# Patient Record
Sex: FEMALE | Race: OTHER | NOT HISPANIC OR LATINO | URBAN - METROPOLITAN AREA
[De-identification: names, ages, dates, MRNs, and addresses within clinical notes are randomized per-mention and may not be internally consistent; named-entity substitution may affect disease eponyms.]

---

## 2017-06-20 ENCOUNTER — EMERGENCY (EMERGENCY)
Facility: HOSPITAL | Age: 22
LOS: 1 days | Discharge: HOME | End: 2017-06-20
Attending: EMERGENCY MEDICINE | Admitting: EMERGENCY MEDICINE
Payer: COMMERCIAL

## 2017-06-20 VITALS
RESPIRATION RATE: 18 BRPM | TEMPERATURE: 98 F | SYSTOLIC BLOOD PRESSURE: 107 MMHG | HEART RATE: 82 BPM | DIASTOLIC BLOOD PRESSURE: 73 MMHG | OXYGEN SATURATION: 100 %

## 2017-06-20 VITALS
OXYGEN SATURATION: 100 % | HEART RATE: 74 BPM | DIASTOLIC BLOOD PRESSURE: 72 MMHG | RESPIRATION RATE: 16 BRPM | SYSTOLIC BLOOD PRESSURE: 103 MMHG

## 2017-06-20 DIAGNOSIS — N75.0 CYST OF BARTHOLIN'S GLAND: ICD-10-CM

## 2017-06-20 PROCEDURE — 99284 EMERGENCY DEPT VISIT MOD MDM: CPT

## 2017-06-20 RX ORDER — ACETAMINOPHEN 500 MG
975 TABLET ORAL ONCE
Qty: 0 | Refills: 0 | Status: COMPLETED | OUTPATIENT
Start: 2017-06-20 | End: 2017-06-20

## 2017-06-20 RX ADMIN — Medication 975 MILLIGRAM(S): at 12:42

## 2017-06-20 RX ADMIN — Medication 975 MILLIGRAM(S): at 11:19

## 2017-06-20 NOTE — CONSULT NOTE ADULT - PROBLEM SELECTOR RECOMMENDATION 9
-Tylenol and Motrin for analgesia  -warm compresses  -sitz baths  -Gc/Ct cx  -outpatient follow-up within 1 week, info given for [facility] practice    d/w Dr Jasso

## 2017-06-20 NOTE — ED PROVIDER NOTE - OBJECTIVE STATEMENT
BRITTNI Lucero  22F hx bartholin gland abscess s/p marsupialization 2 years ago on left presents to the ED with barothloin gland swelling and pain. Pain started 2 days ago now with swelling. Feels like previous bartholin abscess. No fevers nausea vomiting vaginal discharge dysuria abdominal pain. Pt was recently tested for std which was negative so doesn't want to be tested at this time.

## 2017-06-20 NOTE — ED PROVIDER NOTE - NS ED ROS FT
Constitutional: No fever or chills  Eyes: No visual changes  HEENT: No throat pain  CV: No chest pain  Resp: No SOB no cough  GI: No abd pain, nausea or vomiting  : No dysuria no discharge. + swelling to vaginal region  MSK: No musculoskeletal pain  Skin: No rash  Neuro: No headache

## 2017-06-20 NOTE — ED PROVIDER NOTE - PHYSICAL EXAMINATION
Constitutional: mild distress  Eyes: PERRLA EOMI  Head: Normocephalic atraumatic  Cardiac: regular rate   Resp: Lungs CTAB  GI: Abd s/nt/nd  : Chaperone - Cat - pt with swelling and flutuance at 7 o'clock position. no signs of infection.  no rashes.   Neuro: CN2-12 intact  Skin: No rashes

## 2017-06-20 NOTE — CONSULT NOTE ADULT - SUBJECTIVE AND OBJECTIVE BOX
Chief Complaint: R sided labial pain    HPI: 21 y/o  presents complaining of R sided labial pain since last night.  Pain is 8/10 when sitting, improved with standing, not relieved by Motrin or compresses.  Patient has history of L-sided Bartholin Cyst, s/p Marsupialization in 10/2015 and has been asymptomatic since that time.  Patient denies vaginal discharge and vaginal bleeding.  Patient is sexually active with one male partner, uses condoms as contraception.    OB Hx: etop with D+C  Gyn Hx: as above    PAST MEDICAL & SURGICAL HISTORY:  No pertinent past medical history  No significant past surgical history      Allergies    No Known Allergies    Intolerances        MEDICATIONS  (STANDING):none    MEDICATIONS  (PRN): motrin      ROS:  CONSTITUTIONAL: No fever, weight loss, or fatigue  GENITOURINARY: Labial pain, no discharge or vaginal bleeding.  No dysuria, frequency, hematuria, or incontinence    PHYSICAL EXAM-      Vital Signs Last 24 Hrs  T(C): 36.9, Max: 36.9 (06-20 @ 09:41)  T(F): 98.4, Max: 98.4 (06-20 @ 09:41)  HR: 74 (74 - 82)  BP: 103/72 (103/72 - 107/73)  BP(mean): --  RR: 16 (16 - 18)  SpO2: 100% (100% - 100%)    Constitutional: well developed, well nourished, no apparent distress, alert, oriented x 3.    Abdomen: soft, non-tender, +BS, no guarding/rebound/rigidity/organomegaly.    Pelvic exam- External genitalia- R sided 2-3 cm non-fluctuant cyst, no overlying erythema or discharge

## 2017-06-20 NOTE — ED PROVIDER NOTE - PLAN OF CARE
1. return for worsening symptoms or anything concerning to you  2. take all home meds as prescribed  3. follow up with your pmd call to make an appointment  4. use sitz baths and warm compresses as directed  5. follow up with obgyn call [phone]

## 2017-06-20 NOTE — ED PROVIDER NOTE - PROGRESS NOTE DETAILS
pt seen by obgyn service - not large enough to I and d now - want patient to have sitz baths, warm compresses and follow up in office. pt ok with plan. Quan Lainez M.D. Dr. Henderson Note: agree with Gyn recommendation and will have pt f/u outp s/p period of Sitz baths.

## 2017-06-20 NOTE — ED PROVIDER NOTE - MEDICAL DECISION MAKING DETAILS
22F hx of bartholin gland abscess presents with swelling and pain at the 7 o'clock position that feels like previous abscess. Will have obgyn see patient pain control and reassess

## 2017-06-20 NOTE — ED PROVIDER NOTE - ATTENDING CONTRIBUTION TO CARE
Pt with right side Bartholin cyst, small on exam, about 2cm max diameter, tender, non-draining.  Will consult Gyn given Dr. Lucero has performed surgery for this problem in past.

## 2017-06-20 NOTE — ED ADULT NURSE NOTE - OBJECTIVE STATEMENT
c/o bartholin cyst. Painful marble sized swelling at right side of vaginal opening x 1 day. Reports hx of bartholin cyst 2 years ago which required admission to hospital for I&D in OR. Denies fever, chills or any urinary complaints.

## 2017-06-21 LAB
C TRACH RRNA SPEC QL NAA+PROBE: SIGNIFICANT CHANGE UP
N GONORRHOEA RRNA SPEC QL NAA+PROBE: SIGNIFICANT CHANGE UP
SPECIMEN SOURCE: SIGNIFICANT CHANGE UP

## 2017-06-24 ENCOUNTER — EMERGENCY (EMERGENCY)
Facility: HOSPITAL | Age: 22
LOS: 1 days | Discharge: HOME | End: 2017-06-24
Attending: PEDIATRICS | Admitting: PEDIATRICS
Payer: COMMERCIAL

## 2017-06-24 VITALS
OXYGEN SATURATION: 99 % | SYSTOLIC BLOOD PRESSURE: 116 MMHG | RESPIRATION RATE: 18 BRPM | DIASTOLIC BLOOD PRESSURE: 77 MMHG | HEART RATE: 89 BPM

## 2017-06-24 VITALS
TEMPERATURE: 99 F | OXYGEN SATURATION: 99 % | SYSTOLIC BLOOD PRESSURE: 112 MMHG | RESPIRATION RATE: 18 BRPM | DIASTOLIC BLOOD PRESSURE: 75 MMHG | HEART RATE: 96 BPM

## 2017-06-24 DIAGNOSIS — N75.1 ABSCESS OF BARTHOLIN'S GLAND: ICD-10-CM

## 2017-06-24 LAB
ALBUMIN SERPL ELPH-MCNC: 4.2 G/DL — SIGNIFICANT CHANGE UP (ref 3.3–5)
ALP SERPL-CCNC: 62 U/L — SIGNIFICANT CHANGE UP (ref 40–120)
ALT FLD-CCNC: 9 U/L RC — LOW (ref 10–45)
ANION GAP SERPL CALC-SCNC: 16 MMOL/L — SIGNIFICANT CHANGE UP (ref 5–17)
APPEARANCE UR: CLEAR — SIGNIFICANT CHANGE UP
AST SERPL-CCNC: 15 U/L — SIGNIFICANT CHANGE UP (ref 10–40)
BASOPHILS # BLD AUTO: 0.1 K/UL — SIGNIFICANT CHANGE UP (ref 0–0.2)
BILIRUB SERPL-MCNC: 0.3 MG/DL — SIGNIFICANT CHANGE UP (ref 0.2–1.2)
BILIRUB UR-MCNC: NEGATIVE — SIGNIFICANT CHANGE UP
BUN SERPL-MCNC: 10 MG/DL — SIGNIFICANT CHANGE UP (ref 7–23)
CALCIUM SERPL-MCNC: 10.2 MG/DL — SIGNIFICANT CHANGE UP (ref 8.4–10.5)
CHLORIDE SERPL-SCNC: 103 MMOL/L — SIGNIFICANT CHANGE UP (ref 96–108)
CO2 SERPL-SCNC: 22 MMOL/L — SIGNIFICANT CHANGE UP (ref 22–31)
COLOR SPEC: YELLOW — SIGNIFICANT CHANGE UP
CREAT SERPL-MCNC: 0.74 MG/DL — SIGNIFICANT CHANGE UP (ref 0.5–1.3)
DIFF PNL FLD: NEGATIVE — SIGNIFICANT CHANGE UP
EOSINOPHIL # BLD AUTO: 0 K/UL — SIGNIFICANT CHANGE UP (ref 0–0.5)
EPI CELLS # UR: SIGNIFICANT CHANGE UP /HPF
GAS PNL BLDV: SIGNIFICANT CHANGE UP
GLUCOSE SERPL-MCNC: 90 MG/DL — SIGNIFICANT CHANGE UP (ref 70–99)
GLUCOSE UR QL: NEGATIVE — SIGNIFICANT CHANGE UP
HCG SERPL-ACNC: <2 MIU/ML — SIGNIFICANT CHANGE UP
HCT VFR BLD CALC: 40.1 % — SIGNIFICANT CHANGE UP (ref 34.5–45)
HGB BLD-MCNC: 13.8 G/DL — SIGNIFICANT CHANGE UP (ref 11.5–15.5)
KETONES UR-MCNC: ABNORMAL
LEUKOCYTE ESTERASE UR-ACNC: NEGATIVE — SIGNIFICANT CHANGE UP
LYMPHOCYTES # BLD AUTO: 1.6 K/UL — SIGNIFICANT CHANGE UP (ref 1–3.3)
LYMPHOCYTES # BLD AUTO: 7 % — LOW (ref 13–44)
MCHC RBC-ENTMCNC: 32 PG — SIGNIFICANT CHANGE UP (ref 27–34)
MCHC RBC-ENTMCNC: 34.4 GM/DL — SIGNIFICANT CHANGE UP (ref 32–36)
MCV RBC AUTO: 93 FL — SIGNIFICANT CHANGE UP (ref 80–100)
MONOCYTES # BLD AUTO: 1 K/UL — HIGH (ref 0–0.9)
MONOCYTES NFR BLD AUTO: 4 % — SIGNIFICANT CHANGE UP (ref 2–14)
NEUTROPHILS # BLD AUTO: 12.8 K/UL — HIGH (ref 1.8–7.4)
NEUTROPHILS NFR BLD AUTO: 89 % — HIGH (ref 43–77)
NITRITE UR-MCNC: NEGATIVE — SIGNIFICANT CHANGE UP
PH UR: 6 — SIGNIFICANT CHANGE UP (ref 5–8)
PLATELET # BLD AUTO: 371 K/UL — SIGNIFICANT CHANGE UP (ref 150–400)
POTASSIUM SERPL-MCNC: 4.2 MMOL/L — SIGNIFICANT CHANGE UP (ref 3.5–5.3)
POTASSIUM SERPL-SCNC: 4.2 MMOL/L — SIGNIFICANT CHANGE UP (ref 3.5–5.3)
PROT SERPL-MCNC: 8.2 G/DL — SIGNIFICANT CHANGE UP (ref 6–8.3)
PROT UR-MCNC: SIGNIFICANT CHANGE UP
RBC # BLD: 4.31 M/UL — SIGNIFICANT CHANGE UP (ref 3.8–5.2)
RBC # FLD: 12.2 % — SIGNIFICANT CHANGE UP (ref 10.3–14.5)
SODIUM SERPL-SCNC: 141 MMOL/L — SIGNIFICANT CHANGE UP (ref 135–145)
SP GR SPEC: 1.02 — SIGNIFICANT CHANGE UP (ref 1.01–1.02)
UROBILINOGEN FLD QL: NEGATIVE — SIGNIFICANT CHANGE UP
WBC # BLD: 15.5 K/UL — HIGH (ref 3.8–10.5)
WBC # FLD AUTO: 15.5 K/UL — HIGH (ref 3.8–10.5)
WBC UR QL: SIGNIFICANT CHANGE UP /HPF (ref 0–5)

## 2017-06-24 PROCEDURE — 82947 ASSAY GLUCOSE BLOOD QUANT: CPT

## 2017-06-24 PROCEDURE — 84295 ASSAY OF SERUM SODIUM: CPT

## 2017-06-24 PROCEDURE — 85014 HEMATOCRIT: CPT

## 2017-06-24 PROCEDURE — 80053 COMPREHEN METABOLIC PANEL: CPT

## 2017-06-24 PROCEDURE — 87205 SMEAR GRAM STAIN: CPT

## 2017-06-24 PROCEDURE — 87086 URINE CULTURE/COLONY COUNT: CPT

## 2017-06-24 PROCEDURE — 83605 ASSAY OF LACTIC ACID: CPT

## 2017-06-24 PROCEDURE — 82803 BLOOD GASES ANY COMBINATION: CPT

## 2017-06-24 PROCEDURE — 84702 CHORIONIC GONADOTROPIN TEST: CPT

## 2017-06-24 PROCEDURE — 99284 EMERGENCY DEPT VISIT MOD MDM: CPT

## 2017-06-24 PROCEDURE — 56420 I&D BARTHOLINS GLAND ABSCESS: CPT | Mod: RT

## 2017-06-24 PROCEDURE — 96374 THER/PROPH/DIAG INJ IV PUSH: CPT | Mod: XU

## 2017-06-24 PROCEDURE — 81001 URINALYSIS AUTO W/SCOPE: CPT

## 2017-06-24 PROCEDURE — 99284 EMERGENCY DEPT VISIT MOD MDM: CPT | Mod: 25

## 2017-06-24 PROCEDURE — 82435 ASSAY OF BLOOD CHLORIDE: CPT

## 2017-06-24 PROCEDURE — 87070 CULTURE OTHR SPECIMN AEROBIC: CPT

## 2017-06-24 PROCEDURE — 85027 COMPLETE CBC AUTOMATED: CPT

## 2017-06-24 PROCEDURE — 84132 ASSAY OF SERUM POTASSIUM: CPT

## 2017-06-24 PROCEDURE — 56420 I&D BARTHOLINS GLAND ABSCESS: CPT

## 2017-06-24 PROCEDURE — 82330 ASSAY OF CALCIUM: CPT

## 2017-06-24 RX ORDER — MORPHINE SULFATE 50 MG/1
4 CAPSULE, EXTENDED RELEASE ORAL ONCE
Qty: 0 | Refills: 0 | Status: DISCONTINUED | OUTPATIENT
Start: 2017-06-24 | End: 2017-06-24

## 2017-06-24 RX ORDER — LIDOCAINE 4 G/100G
1 CREAM TOPICAL ONCE
Qty: 0 | Refills: 0 | Status: DISCONTINUED | OUTPATIENT
Start: 2017-06-24 | End: 2017-06-28

## 2017-06-24 RX ORDER — CEPHALEXIN 500 MG
1 CAPSULE ORAL
Qty: 0 | Refills: 0 | COMMUNITY

## 2017-06-24 RX ORDER — METRONIDAZOLE 7.5 MG/G
0 GEL VAGINAL
Qty: 0 | Refills: 0 | COMMUNITY

## 2017-06-24 RX ORDER — MORPHINE SULFATE 50 MG/1
4 CAPSULE, EXTENDED RELEASE ORAL ONCE
Qty: 0 | Refills: 0 | Status: COMPLETED | OUTPATIENT
Start: 2017-06-24 | End: 2017-06-24

## 2017-06-24 RX ADMIN — MORPHINE SULFATE 4 MILLIGRAM(S): 50 CAPSULE, EXTENDED RELEASE ORAL at 15:50

## 2017-06-24 RX ADMIN — MORPHINE SULFATE 4 MILLIGRAM(S): 50 CAPSULE, EXTENDED RELEASE ORAL at 15:43

## 2017-06-24 NOTE — CONSULT NOTE ADULT - PROBLEM SELECTOR RECOMMENDATION 9
-marsupialization performed in the ED  -f/u wound cultures   -continue keflex   -follow up with GYN on Tuesday, per patient she has an appointment with private GYN in [place]  Dr. Souza at bedside for procedure  Katherine Jean Baptiste [id number]

## 2017-06-24 NOTE — ED ADULT NURSE NOTE - OBJECTIVE STATEMENT
pt has an infected cyst on her vagina  she was heree tuesday but the cyst was very small   she also has a history of having to go to the or for drainage of the cyst

## 2017-06-24 NOTE — ED PROVIDER NOTE - CARE PLAN
Principal Discharge DX:	Bartholin's gland abscess  Instructions for follow-up, activity and diet:	You were seen in the Emergency Department for vaginal pain. The cyst was drained by the gynecologist. Take the antibiotics as written. Please follow up with your gynecologist as soon as possible for further evaluation. Take ibuprofen and tylenol as needed for pain. Please return to the Emergency Department if you have any new concerning symptoms such as severe pain, weakness, shortness of breath or any other concerning symptoms.

## 2017-06-24 NOTE — ED PROVIDER NOTE - PLAN OF CARE
You were seen in the Emergency Department for vaginal pain. The cyst was drained by the gynecologist. Take the antibiotics as written. Please follow up with your gynecologist as soon as possible for further evaluation. Take ibuprofen and tylenol as needed for pain. Please return to the Emergency Department if you have any new concerning symptoms such as severe pain, weakness, shortness of breath or any other concerning symptoms.

## 2017-06-24 NOTE — ED PROVIDER NOTE - OBJECTIVE STATEMENT
23yo p/w vaginal pain. Pt. reporting history of bartholin cyst on left in past, now p/w pain on right. Pt. seen here tuesday, discahrged. Now having vaginal discharge, seen by Dr. Brando Mancia, told she has yeast infection and BV, started on cephalexin and doxycycline. Pt. doing sitz baths, warm compresses, but now with worsening pain. Pt. reports similar symptoms to bartholin cists in past. Pt. report dysuria. Pt. sexually active. No history of STI's. LMP: 21yo p/w vaginal pain. Pt. reporting history of bartholin cyst on left in past, now p/w pain on right. Pt. seen here tuesday, discahrged. Now having vaginal discharge, seen by Dr. Brando Mancia, told she has yeast infection and BV, started on cephalexin and doxycycline. Pt. doing sitz baths, warm compresses, but now with worsening pain. Pt. reports similar symptoms to bartholin cists in past. Pt. report dysuria. Pt. sexually active. No history of STI's. LMP:6/2

## 2017-06-24 NOTE — CONSULT NOTE ADULT - ATTENDING COMMENTS
23yo  with R bartholin's abscess, history of L Bartholin's abscess, pt failed conservative management and now presents with increased pain.  I personally performed I&D at bedside with housestaff, pt to continue po antibiotics prescribed at prior ED visit, as well as bid sitz baths.  Pt has appointment scheduled with an outside Gyn Tuesday

## 2017-06-24 NOTE — CONSULT NOTE ADULT - SUBJECTIVE AND OBJECTIVE BOX
22y  presents with worsening pain and subjective fevers in the setting of known right-sided bartholin cyst. She was seen in the ED on  and sent home with sitz baths, keflex, and follow up with [facility]. She does not have an OBGYN. She reports feeling feverish yesterday and that the pain is not controlled with motrin and tylenol. Patient has history of L-sided Bartholin Cyst, s/p Marsupialization in 10/2015 and has been asymptomatic since that time.  Patient denies vaginal discharge and vaginal bleeding.  Patient is sexually active with one male partner, uses condoms as contraception.      OB/GYN HISTORY: etop, bartholin cyst with marsupialization 2 years ago   PAST MEDICAL & SURGICAL HISTORY:  No pertinent past medical history  No significant past surgical history    Allergies    No Known Allergies    Intolerances      MEDICATIONS  (STANDING):  lidocaine 2% Gel 1Application(s) Topical Once  morphine  - Injectable 4milliGRAM(s) IV Push Once    MEDICATIONS  (PRN):    FAMILY HISTORY:  No pertinent family history in first degree relatives    SOCIAL HISTORY:       Vital Signs Last 24 Hrs  T(C): 37.4, Max: 37.4 ( @ 13:54)  T(F): 99.3, Max: 99.3 ( @ 13:54)  HR: 89 (89 - 96)  BP: 116/77 (112/75 - 116/77)  BP(mean): --  RR: 18 (18 - 18)  SpO2: 99% (99% - 99%)    PHYSICAL EXAM:      Constitutional: alert and oriented x 3    Breasts: no tenderness, no nodules    Respiratory: clear    Cardiovascular: regular rate and rhythm    Gastrointestinal: soft, non tender, + bowel sounds. No hepatosplenomegaly, no palpable masses    Genitourinary: right-sided bartholin cyst with surrounding erythema involving the right labia         LABS:                        13.8   15.5  )-----------( 371      ( 2017 15:08 )             40.1     -    141  |  103  |  10  ----------------------------<  90  4.2   |  22  |  0.74    Ca    10.2      2017 15:08    TPro  8.2  /  Alb  4.2  /  TBili  0.3  /  DBili  x   /  AST  15  /  ALT  9<L>  /  AlkPhos  62  06-24      Urinalysis Basic - ( 2017 16:51 )    Color: Yellow / Appearance: Clear / S.023 / pH: x  Gluc: x / Ketone: Small  / Bili: Negative / Urobili: Negative   Blood: x / Protein: Trace / Nitrite: Negative   Leuk Esterase: Negative / RBC: x / WBC 3-5 /HPF   Sq Epi: x / Non Sq Epi: OCC /HPF / Bacteria: x            Procedure: 5cc of 1% lidocaine injected. Incision made over the right vagina mucosa into the bartholin cyst. Purulent material drained from cyst. Cultures sent. Copious irrigation with saline. Edges of incision marsupialized with 3.0 vicryl. Excellent hemostasis. Pt tolerated procedure well.

## 2017-06-24 NOTE — ED PROVIDER NOTE - PROGRESS NOTE DETAILS
Bartholin's incised and drained by gyn, significant amount of pus removed. Labs with leukocytosis, patient not septic appearing, do not suspect SBI. REcommend continue abx per PMD. Has follow up with gyn in 3 days. REturn if pain worsens, documented fever develops, any concerns. - Romi Turner MD

## 2017-06-24 NOTE — ED PROVIDER NOTE - ATTENDING CONTRIBUTION TO CARE
22y F with PMHx L bartholin's cyst on L, here with R vaginal pain. Came to ED 4 days ago, seen by GYN and cyst was too small for procedure, dc'd with sitz baths. Vaginal discharge started 3 days ago, seen by PMD, diagnosed with yeast infection and BV, started on cephalexin and doxy, and diflucan. Now on doxy and cephalexin. Subjective fevers at home. Dizziness and "ringing in ears" associated with subjective temp. Tylenol and motrin with some relief. Last took tylenol at 11am. Pain was initially improving but then worsened this morning so came to ED. +sexually active, no protection. Negative STD testing in the past. Also endorses dysuria. No abdominal pain. 22y F with PMHx L bartholin's cyst on L, here with R vaginal pain. Came to ED 4 days ago, seen by GYN and cyst was too small for procedure, dc'd with sitz baths. Vaginal discharge started 3 days ago, seen by PMD, diagnosed with yeast infection and BV, started on cephalexin and doxy, and diflucan. Now on doxy and cephalexin. Subjective fevers at home. Dizziness and "ringing in ears" associated with subjective temp. Tylenol and motrin with some relief. Last took tylenol at 11am. Pain was initially improving but then worsened this morning so came to ED. +sexually active, no protection. Negative STD testing in the past. Also endorses dysuria. No abdominal pain.  Vital Signs Stable  Gen: well appearing, NAD  HEENT: no conjunctivitis, MMM  Neck supple  Cardiac: regular rate rhythm, normal S1S2  Chest: CTA BL, no wheeze or crackles  Abdomen: normal BS, soft, NT  Extremity: no gross deformity, good perfusion  Skin: no rash  Neuro: grossly normal   : R inferior labia majora swelling, induration, tenderness    AP 22y F with R labia pain, subjective temp. IVF, pain control, gyn consult.

## 2017-06-25 LAB
CULTURE RESULTS: NO GROWTH — SIGNIFICANT CHANGE UP
SPECIMEN SOURCE: SIGNIFICANT CHANGE UP

## 2017-06-30 LAB
CULTURE RESULTS: SIGNIFICANT CHANGE UP
SPECIMEN SOURCE: SIGNIFICANT CHANGE UP